# Patient Record
Sex: FEMALE | Race: WHITE | Employment: STUDENT | URBAN - METROPOLITAN AREA
[De-identification: names, ages, dates, MRNs, and addresses within clinical notes are randomized per-mention and may not be internally consistent; named-entity substitution may affect disease eponyms.]

---
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
## 2024-09-12 NOTE — TELEPHONE ENCOUNTER
Order for 90 days sent to pharmacy  Pt portal message sent to schedule his 3 year post op appointment .